# Patient Record
Sex: FEMALE | Race: WHITE | NOT HISPANIC OR LATINO | Employment: UNEMPLOYED | URBAN - METROPOLITAN AREA
[De-identification: names, ages, dates, MRNs, and addresses within clinical notes are randomized per-mention and may not be internally consistent; named-entity substitution may affect disease eponyms.]

---
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
## 2025-01-08 ENCOUNTER — OFFICE VISIT (OUTPATIENT)
Dept: URGENT CARE | Age: 20
End: 2025-01-08
Payer: COMMERCIAL

## 2025-01-08 VITALS
OXYGEN SATURATION: 99 % | HEART RATE: 83 BPM | TEMPERATURE: 98.2 F | RESPIRATION RATE: 18 BRPM | SYSTOLIC BLOOD PRESSURE: 131 MMHG | DIASTOLIC BLOOD PRESSURE: 84 MMHG

## 2025-01-08 DIAGNOSIS — J40 BRONCHITIS: Primary | ICD-10-CM

## 2025-01-08 PROCEDURE — 3079F DIAST BP 80-89 MM HG: CPT

## 2025-01-08 PROCEDURE — 99213 OFFICE O/P EST LOW 20 MIN: CPT

## 2025-01-08 PROCEDURE — 3075F SYST BP GE 130 - 139MM HG: CPT

## 2025-01-08 RX ORDER — ALBUTEROL SULFATE 90 UG/1
2 INHALANT RESPIRATORY (INHALATION) EVERY 4 HOURS PRN
Qty: 8.5 G | Refills: 0 | Status: SHIPPED | OUTPATIENT
Start: 2025-01-08 | End: 2026-01-08

## 2025-01-08 RX ORDER — AZITHROMYCIN 250 MG/1
TABLET, FILM COATED ORAL
Qty: 6 TABLET | Refills: 0 | Status: SHIPPED | OUTPATIENT
Start: 2025-01-08 | End: 2025-01-13

## 2025-01-08 RX ORDER — PREDNISONE 20 MG/1
40 TABLET ORAL DAILY
Qty: 10 TABLET | Refills: 0 | Status: SHIPPED | OUTPATIENT
Start: 2025-01-08 | End: 2025-01-13

## 2025-01-08 RX ORDER — FLUCONAZOLE 150 MG/1
150 TABLET ORAL SEE ADMIN INSTRUCTIONS
Qty: 2 TABLET | Refills: 0 | Status: SHIPPED | OUTPATIENT
Start: 2025-01-08 | End: 2025-01-09

## 2025-01-08 ASSESSMENT — ENCOUNTER SYMPTOMS
CONSTIPATION: 0
TROUBLE SWALLOWING: 0
SORE THROAT: 1
FEVER: 0
WHEEZING: 1
COUGH: 1
DIARRHEA: 0
CHEST TIGHTNESS: 0
NAUSEA: 0
CHILLS: 0
RHINORRHEA: 1
PALPITATIONS: 0
SHORTNESS OF BREATH: 0

## 2025-01-08 NOTE — PROGRESS NOTES
"Subjective   Patient ID: Yessi Villela \"Patricia\" is a 19 y.o. female. They present today with a chief complaint of Cough (Pt advised that she has had a cough, nasal congestion, post nasal drip, and nausea for the past 1 week. Pt did have a fever, but that has subsided 2 days prior. /Pt recently got over pneumonia. ).    History of Present Illness  Patient presents for one week of cough, congestion, wheezing, post nasal drainage, sinus pressure and nausea from post nasal drip. Claims fever that had resolved. Denies chest pain, sob. Denies diarrhea. Denies history of asthma. Has been on Augmentin for ear infection.           Past Medical History  Allergies as of 01/08/2025 - Reviewed 01/08/2025   Allergen Reaction Noted    Acetaminophen Unknown 09/25/2024    Ciprofloxacin Unknown 09/25/2024    Hyoscyamine Hives 12/20/2021    Spironolactone Dizziness, Headache, and Nausea/vomiting 09/25/2024    Cefdinir Hives and Rash 01/14/2018       (Not in a hospital admission)       Past Medical History:   Diagnosis Date    Allergy to other foods 04/23/2021    History of food allergy    Body mass index (BMI) pediatric, 5th percentile to less than 85th percentile for age 07/02/2021    BMI (body mass index), pediatric, 5% to less than 85% for age    Dorsalgia, unspecified 07/20/2022    Chronic bilateral back pain, unspecified back location    Encounter for other general counseling and advice on contraception 07/19/2019    Birth control counseling    Encounter for other general counseling and advice on contraception 11/23/2021    General counseling for prescription of oral contraceptives    Encounter for routine child health examination without abnormal findings 12/16/2022    Encounter for routine child health examination without abnormal findings    Encounter for screening for COVID-19 11/18/2022    Encounter for screening for COVID-19    Encounter for surveillance of contraceptive pills 12/23/2022    Encounter for surveillance of " contraceptive pills    Epilepsy, unspecified, not intractable, without status epilepticus 02/09/2021    Epilepsy    Personal history of other diseases of the female genital tract 07/02/2021    History of dysmenorrhea    Personal history of other diseases of the respiratory system 04/22/2021    History of chronic rhinitis    Personal history of other diseases of urinary system 08/25/2021    History of hematuria    Personal history of other endocrine, nutritional and metabolic disease     History of Hashimoto thyroiditis    Personal history of other mental and behavioral disorders 01/27/2021    History of anxiety    Personal history of urinary (tract) infections 11/07/2020    History of urinary tract infection       Past Surgical History:   Procedure Laterality Date    OTHER SURGICAL HISTORY  11/18/2022    No history of surgery            Review of Systems  Review of Systems   Constitutional:  Negative for chills and fever.   HENT:  Positive for congestion, postnasal drip, rhinorrhea and sore throat. Negative for ear pain and trouble swallowing.    Respiratory:  Positive for cough and wheezing. Negative for chest tightness and shortness of breath.    Cardiovascular:  Negative for chest pain and palpitations.   Gastrointestinal:  Negative for constipation, diarrhea and nausea.   Skin:  Negative for rash.                                  Objective    Vitals:    01/08/25 1738   BP: 131/84   Pulse: 83   Resp: 18   Temp: 36.8 °C (98.2 °F)   SpO2: 99%     No LMP recorded.    Physical Exam  Constitutional:       General: She is not in acute distress.     Appearance: She is not toxic-appearing.   HENT:      Right Ear: Tympanic membrane and external ear normal.      Left Ear: Tympanic membrane and external ear normal.      Nose: Congestion present.      Right Sinus: No frontal sinus tenderness.      Left Sinus: No frontal sinus tenderness.      Mouth/Throat:      Mouth: Mucous membranes are moist. No oral lesions.       Pharynx: Postnasal drip present. No oropharyngeal exudate or posterior oropharyngeal erythema.   Eyes:      Pupils: Pupils are equal, round, and reactive to light.   Cardiovascular:      Rate and Rhythm: Normal rate and regular rhythm.   Pulmonary:      Effort: Pulmonary effort is normal. No respiratory distress.      Breath sounds: Examination of the right-upper field reveals wheezing. Examination of the left-upper field reveals wheezing. Examination of the right-middle field reveals wheezing. Examination of the left-middle field reveals wheezing. Examination of the right-lower field reveals wheezing. Examination of the left-lower field reveals wheezing. Wheezing present.      Comments: Moderate diffuse wheeze on expiration b/l.   Musculoskeletal:      Cervical back: Normal range of motion.   Neurological:      Mental Status: She is alert.         Procedures    Point of Care Test & Imaging Results from this visit  No results found for this visit on 01/08/25.   No results found.    Diagnostic study results (if any) were reviewed by Radha Hernandez PA-C.    Assessment/Plan   Allergies, medications, history, and pertinent labs/EKGs/Imaging reviewed by Radha Hernandez PA-C.     Medical Decision Making  MDM- History and exam consistent with acute bronchitis. No evidence of pneumonia, sepsis or other acute cardiopulmonary pathology. Based on current exam I don't feel that imaging, labs, or further work up are warranted at this point. Based on current exam and past medical history, plan is for antibiotics and symptomatic therapies. Diflucan for history of yeast infection post abx us. Patient advised to return to clinic or go to the ED if symptoms change or worsen. Patient verbalized understanding and agrees with plan.       Orders and Diagnoses  Diagnoses and all orders for this visit:  Bronchitis  -     predniSONE (Deltasone) 20 mg tablet; Take 2 tablets (40 mg) by mouth once daily for 5 days.  -     albuterol  (ProAir HFA) 90 mcg/actuation inhaler; Inhale 2 puffs every 4 hours if needed for wheezing or shortness of breath.  -     azithromycin (Zithromax) 250 mg tablet; Take 2 tabs (500 mg) by mouth today, than 1 daily for 4 days.  -     fluconazole (Diflucan) 150 mg tablet; Take 1 tablet (150 mg) by mouth see administration instructions for 1 day. Take one tab now. Repeat in 7 days if symptoms persist.      Medical Admin Record      Patient disposition: Home    Electronically signed by Radha Hernandez PA-C  5:44 PM

## 2025-01-08 NOTE — LETTER
January 8, 2025     Patient: Yessi Villela   YOB: 2005   Date of Visit: 1/8/2025       To Whom It May Concern:    Yessi Villela was seen in my clinic on 1/8/2025 at 5:30 pm. Please excuse Yessi for her absence from school until symptoms improved       Sincerely,         Radha Hernandez PA-C        CC: No Recipients

## 2025-01-08 NOTE — PATIENT INSTRUCTIONS
Recommended Salt water gargles, hot tea and honey, tylenol/ibuprofen, nasal sprays, steam inhalation    If you develop chest pain, shortness of breath go to ER    Mucinex over the counter for cough    Diflucan if needed for yeast infection post antibiotic use.     Recommended probiotics, yogurt    Follow up with pcp.

## 2025-06-14 ENCOUNTER — OFFICE VISIT (OUTPATIENT)
Dept: URGENT CARE | Age: 20
End: 2025-06-14
Payer: COMMERCIAL

## 2025-06-14 VITALS
TEMPERATURE: 97.8 F | DIASTOLIC BLOOD PRESSURE: 84 MMHG | HEART RATE: 78 BPM | SYSTOLIC BLOOD PRESSURE: 138 MMHG | RESPIRATION RATE: 16 BRPM | OXYGEN SATURATION: 99 %

## 2025-06-14 DIAGNOSIS — B34.8 RHINOVIRUS: Primary | ICD-10-CM

## 2025-06-14 DIAGNOSIS — R68.89 FLU-LIKE SYMPTOMS: ICD-10-CM

## 2025-06-14 LAB
POC HUMAN RHINOVIRUS PCR: POSITIVE
POC INFLUENZA A VIRUS PCR: NEGATIVE
POC INFLUENZA B VIRUS PCR: NEGATIVE
POC RESPIRATORY SYNCYTIAL VIRUS PCR: NEGATIVE
POC STREPTOCOCCUS PYOGENES (GROUP A STREP) PCR: NEGATIVE

## 2025-06-14 NOTE — PATIENT INSTRUCTIONS
History and examination consistent with viral  Illness, +Rhinovirus- no indication for further imaging or antibiotics. No evidence of sepsis, strep,  pneumonia, otitis, bacterial sinusitis or other bacterial infection. Patient counseled on  supportive measures at home.   Rest, hydration, OTC Sinus/Cold, Vicks vaporub, vaporizer in bedroom at night, tylenol/motrin for pain/fever  Patient is encouraged to return to clinic if symptoms change or  worsen and will otherwise follow with PCP.

## 2025-06-14 NOTE — PROGRESS NOTES
SUBJECTIVE:   Patricia Villela is a 20 y.o. female who complains of congestion, sneezing, nasal blockage, post nasal drip, dry cough, and headache for 5 days. She denies a history of chest pain, dizziness, fatigue, shortness of breath, weakness, and wheezing and denies a history of asthma. Patient denies smoke cigarettes.     OBJECTIVE:  ENT:  General: Vitals noted, no distress, afebrile. Normal phonation, no stridor, no trismus  ENT: TMs clear effusion bilaterally, EACs unremarkable. Mastoids nontender. Posterior oropharynx without erythema, exudate, or swelling. Uvula is in the midline and non-edematous. No Noam's angina.  Neck: Supple. No meningismus through full range of motion. No lymphadenopathy.   Cardiac: Regular rate and rhythm, no murmur.  Lungs: Good aeration throughout. No adventitious breath sounds.   Abdomen: Soft, nontender, nonsurgical throughout. Normoactive bowel sounds.   Extremities: No peripheral edema  Skin: No rash  Neuro: No focal neurologic deficits. NIH score is 0.     ASSESSMENT:   viral upper respiratory illness  Rhinovirus    PLAN:  Symptomatic therapy suggested: push fluids, rest, and return office visit prn if symptoms persist or worsen. Lack of antibiotic effectiveness discussed with her. Call or return to clinic prn if these symptoms worsen or fail to improve as anticipated.

## 2025-07-09 ENCOUNTER — APPOINTMENT (OUTPATIENT)
Dept: URGENT CARE | Age: 20
End: 2025-07-09
Payer: COMMERCIAL